# Patient Record
Sex: FEMALE | Race: WHITE | Employment: FULL TIME | URBAN - METROPOLITAN AREA
[De-identification: names, ages, dates, MRNs, and addresses within clinical notes are randomized per-mention and may not be internally consistent; named-entity substitution may affect disease eponyms.]

---

## 2019-04-01 ENCOUNTER — APPOINTMENT (OUTPATIENT)
Dept: PHYSICAL THERAPY | Age: 29
End: 2019-04-01
Attending: FAMILY MEDICINE

## 2019-04-05 ENCOUNTER — APPOINTMENT (OUTPATIENT)
Dept: PHYSICAL THERAPY | Age: 29
End: 2019-04-05
Attending: FAMILY MEDICINE

## 2019-04-26 ENCOUNTER — HOSPITAL ENCOUNTER (OUTPATIENT)
Dept: PHYSICAL THERAPY | Age: 29
Discharge: HOME | End: 2019-04-26
Attending: FAMILY MEDICINE
Payer: OTHER GOVERNMENT

## 2019-04-26 DIAGNOSIS — M62.838 MUSCLE SPASM: ICD-10-CM

## 2019-04-26 PROCEDURE — 97110 THERAPEUTIC EXERCISES: CPT

## 2019-04-26 PROCEDURE — 97161 PT EVAL LOW COMPLEX 20 MIN: CPT

## 2019-04-26 NOTE — THERAPY EVALUATION
Sasha Palma  : 1990  Primary: [place]  Secondary:  [address]  at   [address], [address], [place], [address] [facility]  [phone]   [phone]         OUTPATIENT PHYSICAL THERAPY:Initial Assessment and Discharge 2019    ICD-10: Treatment Diagnosis: Pain in thoracic spine (M54.6)  Muscle weakness (generalized) (M62.81)  Precautions/Allergies:   Gluten   MD Orders: evaluate and treat MEDICAL/REFERRING DIAGNOSIS:  Muscle spasm [M62.838]   DATE OF ONSET: 3/18/19  REFERRING PHYSICIAN: Ruth Perez*  RETURN PHYSICIAN APPOINTMENT: none scheduled      ASSESSMENT (Date: 19):  Ms. Emery Tate presents to physical therapy with complaints of thoracic pain several weeks ago that has now resolved. She is concerned about the pain returning as she doesn't know what caused it the first time. Upon examination, she demonstrates some weakness in the rotator cuff muscles as well as increased tone through the thoracic and cervical paraspinals. This is likely compensatory from the weakness. She was educated on rotator cuff exercises to improve strength and decrease compensatory movements. She was also educated on thoracic mobilization exercises in order to address any future thoracic issues. She demonstrated understanding. No further skilled PT is required at this time. PROBLEM LIST (Impacting functional limitations):  1. Decreased Strength  2. Decreased [place] with Home Exercise Program INTERVENTIONS PLANNED:  1. none   TREATMENT PLAN:  1. Discharge at this time 2° no further skilled treatment indicated    Outcome Measure: Tool Used: Modified Oswestry Low Back Pain Questionnaire  Score:  Initial:  - patient filled it out based on previous pain, not current pain Most Recent:  (Date: -- )   Interpretation of Score: Each section is scored on a 0-5 scale, 5 representing the greatest disability.   The scores of each section are added together for a total score of 50. Ambulatory/Rehab Services H2 Model Falls Risk Assessment    Risk Factors:       No Risk Factors Identified Ability to Rise from Chair:       (0)  Ability to rise in a single movement    Falls Prevention Plan:       No modifications necessary   Total: (5 or greater = High Risk): 0    ©2010 [facility] [place]. All Rights Reserved. [place] Patent #[id number]. Federal Law prohibits the replication, distribution or use without written permission from [facility] [employer]         Total Duration:  PT Patient Time In/Time Out  Time In: 0800  Time Out: 0840        Rehabilitation Potential For Stated Goals: Excellent  Regarding Kandice TONY Weinstein's therapy, I certify that the treatment plan above will be carried out by a therapist or under their direction. Thank you for this referral,  Paulina Britton DPT                HISTORY:   Patient Stated Goal:        Prevent future problems  History of Present Injury/Illness (Reason for Referral):  Localized back pain in the thoracic spine, R side. The pain is gone now but was present for about 2 weeks. Position changes didn't help. Muscle relaxer's helped some. She did a steroid injection that helped for a bit. But the pain was back a few days late. A patch with an anti-inflammatory made the pain go away. She was compensating and now has issues with her neck and soreness there. Thoracic pain has been gone April 1. Past Medical History/Comorbidities:   Ms. Yudelka Holguin  has no past medical history on file. Ms. Yudelka Holguin  has a past surgical history that includes hx wisdom teeth extraction. Per medical history questionnaire/therapist interview: none  Social History/Living Environment:     lives with . Independent with ADLs and IADLs  Prior Level of Function/Work/Activity:  Works as a speech pathologist.  Driving. Carrying heavy bags.    Dominant Side:         RIGHT  Current Medications:    Current Outpatient Medications:     diclofenac (FLECTOR) 1.3 % pt12, 1 Patch by TransDERmal route every twelve (12) hours every twelve (12) hours. , Disp: 4 Patch, Rfl: 0    cyclobenzaprine (FLEXERIL) 10 mg tablet, Take 1 Tab by mouth three (3) times daily as needed for Muscle Spasm(s). , Disp: 20 Tab, Rfl: 0    ibuprofen (MOTRIN) 800 mg tablet, Take 1 Tab by mouth every eight (8) hours as needed for Pain., Disp: , Rfl:     norgestimate-ethinyl estradiol (ORTHO-CYCLEN, SPRINTEC) 0.25-35 mg-mcg tab, Take 1 Tab by mouth daily. , Disp: 1 Package, Rfl: 11    crisaborole (EUCRISA) 2 % oint, Apply thin layer to affected areas twice daily. , Disp: 15 g, Rfl: 0     Date Last Reviewed:  4/26/2019       Number of Personal Factors/Comorbidities that affect the Plan of Care: 0: LOW COMPLEXITY   EXAMINATION:   Observation/Orthostatic Postural Assessment:         Slightly rounded shoulders. Increased lumbar mobility with a slight decrease in thoracic mobility. Mental Status:          WFL  Palpation:          Increased upper thoracic and lower cervical muscle tone. Increased trigger points over thoracic spinal musculature, RTC muscles, upper traps  Sensation:         Light tough: WFL; Proprioception: WFL  Skin Integrity:          Grossly intact  Vision:          NT  Special Tests:          No positive testing  Coordination:       WFL  Balance:          WFL    Lower Extremity:   Strength PROM   Action R L R  L    Hip Flexion       Hip Extension       Hip Abduction       Hip Adduction       Knee Flexion       Knee Extension       Dorsi Flexion       Plantar Flexion       Inversion       Eversion                 Upper Extremity:         Shoulder horizontal abduction 4/5 B  Scapular upward rotation 4/5 B  IR/ER grossly 4+/5 B  Functional Mobility:         Gait/Ambulation:  WFL        Transfers:  WFL        Bed Mobility:  WFL        Stairs:  NT        Wheelchair:  NT              Body Structures Involved:  1. Bones  2. Joints  3.  Muscles Body Functions Affected:  1. None Activities and Participation Affected:  1.  None   Number of elements that affect the Plan of Care: 3: MODERATE COMPLEXITY   CLINICAL PRESENTATION:   Presentation: Stable and uncomplicated: LOW COMPLEXITY   CLINICAL DECISION MAKING:      Use of outcome tool(s) and clinical judgement create a POC that gives a: Clear prediction of patient's progress: LOW COMPLEXITY            EMILY WalshT

## 2019-04-26 NOTE — PROGRESS NOTES
[place]  : 1990  Primary: [place]  Secondary:  [address]  at [facility]  [address], [address], [place], [facility] [address] [facility]  [phone]   [phone]      OUTPATIENT PHYSICAL THERAPY: Daily Treatment Note 2019  Pre-treatment Symptoms/Complaints:  No current pain  Pain: Initial:   0/10 Post Session:  0/10   Medications Last Reviewed:  2019  Updated Objective Findings:  See evaluation note from today   TREATMENT:     THERAPEUTIC EXERCISE: (15 minutes):  Exercises per grid below to improve mobility, strength and dynamic movement of back - generalized to improve functional bending, lifting, carrying and overhead activites. Required moderate visual, verbal and tactile cues to promote proper body alignment and promote proper body mechanics. Progressed complexity of movement as indicated. Date:  19 Date:   Date:     Activity/Exercise Parameters Parameters Parameters   Thoracic mobilization in quadruped X 5 reps B     Foam roll X 10 reps     PWR sidelying twist X 5 reps B     Prone T X 5 reps B     Prone Y X 5 reps B     Sidelying shoulder flexion with arm at 90 ° elbow flexion X 5 reps L               MedBridge Portal  Treatment/Session Summary:    · Response to Treatment:  no increased pain. demonstrated independence with HEP. · Communication/Consultation:  None today  · Equipment provided today:  None today  · Recommendations/Intent for next treatment session: No more treatment indicated. Treatment Plan of Care Effective Dates:  19 to 19  Total Treatment Billable Duration:  15 minutes  PT Patient Time In/Time Out  Time In: 0800  Time Out: 1077 Amalia Simpson DPT    No future appointments.

## 2020-11-20 ENCOUNTER — HOSPITAL ENCOUNTER (OUTPATIENT)
Dept: PHYSICAL THERAPY | Age: 30
Discharge: HOME | End: 2020-11-20
Payer: OTHER GOVERNMENT

## 2020-11-20 DIAGNOSIS — M25.531 BILATERAL WRIST PAIN: ICD-10-CM

## 2020-11-20 DIAGNOSIS — M25.532 BILATERAL WRIST PAIN: ICD-10-CM

## 2020-11-20 PROCEDURE — 97165 OT EVAL LOW COMPLEX 30 MIN: CPT

## 2020-11-20 PROCEDURE — 97018 PARAFFIN BATH THERAPY: CPT

## 2020-11-20 PROCEDURE — 97110 THERAPEUTIC EXERCISES: CPT

## 2020-11-20 PROCEDURE — 97140 MANUAL THERAPY 1/> REGIONS: CPT

## 2020-11-20 NOTE — THERAPY EVALUATION
Pasquale Mantilla  : 1990  Primary: [place]  Secondary:  [address]  at [facility]  [address], [address] [facility], [address]  [phone]   [phone]       OUTPATIENT OCCUPATIONAL THERAPY:Initial Assessment 2020   ICD-10: Treatment Diagnosis: Pain in left wrist (M25.532); Pain in right wrist (M25.531)  Precautions/Allergies:   Gluten   TREATMENT PLAN:  Effective Dates: 2020 TO 2021 (90 days). Frequency/Duration: 1 time a week for 90 Day(s) MEDICAL/REFERRING DIAGNOSIS:  Bilateral wrist pain [M25.531, M25.532]   DATE OF ONSET: ~1 year ago  REFERRING PHYSICIAN: Michael Martinez*  MD Orders: OT evaluate and treat. Return MD Appointment: Pending. INITIAL ASSESSMENT:   Ms. Liliya Pereira presents with decreased functional use, strength and range of motion of her bilateral wrist and upper extremity that is affecting her independence with activities of daily living and ability to perform job tasks. I feel that Ms. Liliya Pereira will benefit from skilled occupational therapy to maximize the functional use of her wrist and upper extremity in daily activities and work tasks. PROBLEM LIST (Impacting functional limitations):  1. Pain in bilateral wrists. 2. Decreased motion in L wrist.  3. Decreased strength in B UE. INTERVENTIONS PLANNED: (Treatment may consist of any combination of the following)  1. Modalities that may include fluidotherapy, paraffin, ultrasound, and light therapy. 2. Therapeutic exercise including a home exercise program.  3. Manual therapy. 4. Therapeutic activities. 5. Orthotic management and training as warranted. GOALS: (Goals have been discussed and agreed upon with patient.)  Short-Term Functional Goals: Time Frame: 4 weeks  1. Decrease pain to moderate or less per Quick-DASH to allow patient to perform self care tasks.   2. Increase motion in L wrist extension flexion by 10 degrees to allow patient to perform all ADL activities. .  3. Complete home B UE exercise program independently. Discharge Goals: Time Frame: 12 weeks  1. Decrease pain to minimal or none per Quick-DASH to allow patient to perform all household and work tasks. 2. Increase motion in L wrist extension flexion by 20 degrees to allow patient to perform all ADL activities. 3. Increase strength in bilateral  by 5-10 pounds to allow patient to , lift, hold, and carry heavy objects. OUTCOME MEASURE:   Tool Used: Disabilities of the Arm, Shoulder and Hand (DASH) Questionnaire - Quick Version  Score:  Initial: 24/55  Most Recent: X/55 (Date: -- )   Interpretation of Score: The DASH is designed to measure the activities of daily living in person's with upper extremity dysfunction or pain. Each section is scored on a 1-5 scale, 5 representing the greatest disability. The scores of each section are added together for a total score of 55. MEDICAL NECESSITY:   · Patient demonstrates good rehab potential due to higher previous functional level. REASON FOR SERVICES/OTHER COMMENTS:  · Patient continues to require skilled intervention due to bilateral wrist pain affecting her overall independence with instrumental ADL and work tasks. Total Duration:  OT Patient Time In/Time Out  Time In: 1430  Time Out: 1515    Rehabilitation Potential For Stated Goals: Good  Regarding Kandice Weinstein's therapy, I certify that the treatment plan above will be carried out by a therapist or under their direction. Thank you for this referral,  Marcin Stout, OTR/L     Referring Physician Signature: Chapito Sanchez*                 PAIN/SUBJECTIVE:   Initial: Pain Intensity 1: 0(at rest)  Post Session:  0/10   OCCUPATIONAL PROFILE & HISTORY:   History of Injury/Illness (Reason for Referral):  Juan Conde states her dorsal wrists have been hurting her for a year.   She states the pain waxes and wanes, but has increased in intensity over the last month or two.  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  Patient states she has pain when she picks her bag up. Past Medical History/Comorbidities:   Ms. Prashant Coker  has no past medical history on file. Ms. Prashant Coker  has a past surgical history that includes hx wisdom teeth extraction. Social History/Living Environment:    Lives with . 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 .    Dominant Side:         RIGHT  Previous Treatment Approaches:          Outpatient PT. Ambulatory/Rehab Services H2 Model Falls Risk Assessment   Risk Factors:       No Risk Factors Identified Ability to Rise from Chair:       (1)  Pushes up, successful in one attempt   Falls Prevention Plan:       No modifications necessary   Total: (5 or greater = High Risk): 0   ©2010 [facility] [place]. All Rights Reserved. [facility] Patent #[id number]. Federal Law prohibits the replication, distribution or use without written permission from [employer]   Current Medications:     No current outpatient medications on file. Date Last Reviewed:  11/20/2020    Complexity Level: Brief history (0):  LOW COMPLEXITY   ASSESSMENT OF OCCUPATIONAL PERFORMANCE:   RANGE OF MOTION:     · AROM:          RUE:  Wrist extension: 75  Flexion: 75  RD: 20  UD: 30  LUE:  Wrist extension: 70  Flexion: 70  RD: 15  UD: 30    · PROM:         L UE:  Wrist extension: 80  Flexion: 80  STRENGTH:   Wrist extension, flexion, finger extension grossly 4+/5.  Strength (Dynamometer on second rung; Average in pounds) 11/20     LUE 62     RUE 67       SENSATION:  Denies numbness/tingling. OBSERVATION/PALPATION:  Tenderness/tightness over wrist/finger extensor muscle bulk. Tenderness over dorsal capitate. EDEMA: None noted.   SPECIAL TESTS: Finklestein's: -; Scaphoid shift: -; Lunate/triquetrum: (tenderness over triquetrum bilterally) Physical Skills Involved:  1. Range of Motion  2. Strength  3. Pain (Chronic) Cognitive Skills Affected (resulting in the inability to perform in a timely and safe manner):  1. None noted or specifically tested Psychosocial Skills Affected:  1. Habits/Routines  2. Social Interaction  3. Social Roles   Number of elements that affect the Plan of Care[de-identified] 1-3:  LOW COMPLEXITY   CLINICAL DECISION MAKING:   Clinical Decision-Making Assessment:  Garo Brennan required none to minimal verbal, visual, physical or environmental cues to carry out assessment tasks.    Assessment process, impact of co-morbidities, assessment modification\need for assistance, and selection of interventions: Analytical Complexity:LOW COMPLEXITY

## 2020-11-20 NOTE — PROGRESS NOTES
Garo Brennan  : 1990  Primary: [place]  Secondary:  [address] at [facility], [address], [place], [facility] [address] [facility]  [phone]   [phone]      OUTPATIENT OCCUPATIONAL THERAPY: Daily Treatment Note 2020  Visit Count:  1    ICD-10: Treatment Diagnosis: Pain in left wrist (M25.532); Pain in right wrist (M25.531)  Precautions/Allergies:   Gluten   TREATMENT PLAN:  Effective Dates: 2020 TO 2021 (90 days). Frequency/Duration: 1 time a week for 90 Day(s)    Pre-treatment Symptoms/Complaints:    Pain: Initial: Pain Intensity 1: 0(at rest)  Post Session:  same/10   Medications Last Reviewed:  2020   Updated Objective Findings:  See evaluation note from today   TREATMENT:   MODALITIES: (5-10 minutes): *  Hot Pack Therapy in order to provide analgesia and improve tissue extensibility in preparation for theapeutic exercises. MODALITIES: (5-10 minutes): *  Paraffin Therapy in order to provide analgesia and improve tissue extensibility in preparation for theapeutic exercises. Therapeutic Exercise: (  5 minutes):  Exercises per grid below to improve mobility and strength. Required minimal visual, verbal, manual and tactile cues to promote proper body alignment, promote proper body posture, promote proper body mechanics and promote proper body breathing techniques. Progressed resistance, range, repetitions and complexity of movement as indicated. Date:   Date:   Date:     Activity/Exercise Parameters Parameters Parameters   Passive extrinsic wrist/finger extensor stretch 2-3 sets held 30-60 seconds each. Home program: As above. Manual Therapy: (10 minutes): Soft tissue mobilization was utilized and necessary because of the patient's restricted joint motion and restricted motion of soft tissue.   Petrissage/effleurage completed over wrist/finger extensor muscle bulk utilizing massage cream in preparation for therapeutic exercise. Patient states it feels \"tight. \"   MedBridge Portal  Treatment/Session Summary:    · Response to Treatment:  tolerated well without complications. · Communication/Consultation:  Eval sent to MD  · Equipment provided today:  None today  · Recommendations/Intent for next treatment session: Next visit will focus on advancement to more challenging activities.     Total Treatment Billable Duration:  45 minutes  OT Patient Time In/Time Out  Time In: 1430  Time Out: 1515  Kate Goodwin OTR/L    Future Appointments   Date Time Provider Isai Bob   12/9/2020  8:45 AM Jaja Fitting, OTR/L [facility]   12/16/2020 11:00 AM Jaja Fitting, OTR/L [facility]   12/21/2020  3:15 PM Jaja Fitting, OTR/L [facility]   1/5/2021  3:15 PM Chrissie Gonzales MD [facility]

## 2020-12-09 ENCOUNTER — HOSPITAL ENCOUNTER (OUTPATIENT)
Dept: PHYSICAL THERAPY | Age: 30
Discharge: HOME | End: 2020-12-09
Payer: OTHER GOVERNMENT

## 2020-12-09 PROCEDURE — 97110 THERAPEUTIC EXERCISES: CPT

## 2020-12-09 PROCEDURE — 97140 MANUAL THERAPY 1/> REGIONS: CPT

## 2020-12-09 NOTE — PROGRESS NOTES
Julia Cole  : 1990  Primary: [place]  Secondary:  [address] at [address] [facility] [address], [address], [place], [facility]  [phone]   [phone]      OUTPATIENT OCCUPATIONAL THERAPY: Daily Treatment Note 2020  Visit Count:  2    ICD-10: Treatment Diagnosis: Pain in left wrist (M25.532); Pain in right wrist (M25.531)  Precautions/Allergies:   Gluten   TREATMENT PLAN:  Effective Dates: 2020 TO 2021 (90 days). Frequency/Duration: 1 time a week for 90 Day(s)    Pre-treatment Symptoms/Complaints:  Patient states she went to yoga yesterday and her medial L elbow hurt following her her forearms were sore. Pain: Initial: Pain Intensity 1: 0  Post Session:  same/10   Medications Last Reviewed:  2020   Updated Objective Findings:  See evaluation note from today   TREATMENT:   MODALITIES: (5-10 minutes): *  Hot Pack Therapy in order to provide analgesia and improve tissue extensibility in preparation for therapeutic exercises. Hot pack placed over bilateral forearms - stated it felt good. MODALITIES: (0 minutes): *  Paraffin Therapy in order to provide analgesia and improve tissue extensibility in preparation for theapeutic exercises. Therapeutic Exercise: (  23 minutes):  Exercises per grid below to improve mobility and strength. Required minimal visual, verbal, manual and tactile cues to promote proper body alignment, promote proper body posture, promote proper body mechanics and promote proper body breathing techniques. Progressed resistance, range, repetitions and complexity of movement as indicated. Date:   Date:   Date:     Activity/Exercise Parameters Parameters Parameters   Passive extrinsic wrist/finger extensor stretch 2-3 sets held 30-60 seconds each. 2-3 sets held 30-60 seconds L UE (review).     Eccentric wrist extension   2-3 sets 5-10 reps with cues to complete slowly 4 lb dumbbell (R UE discontinued after first set - encouraged to use 3 lb instead)    Finger extensor strengthening  1. Green theraputty 2-3 sets 4-5 reps. 2. Rubber bands 1-2 sets 5-10 reps. Home program: As above. Manual Therapy: (15 minutes): Soft tissue mobilization was utilized and necessary because of the patient's restricted joint motion and restricted motion of soft tissue. Petrissage/effleurage completed over wrist/finger extensor muscle bulk utilizing massage cream in preparation for therapeutic exercise. Patient states it feels \"sore\" over her dorsal forearm. 10\" strip of kinesiotape applied over proximal carpal row/ulnar head dorsally moving radially around R wrist twice with 25-50% tension to stabilize wrist and encourage motion. Patient states it felt fine. A ~16\" strip of kinesiotape was cut with 3-4\" distal bifurcation with rounded edges and applied to lateral epicondyle proximally and along wrist/finger extensor muscle bulk with 50% tension then across wrist to dorsum of index/middle fingers just distal to PIP joint. 4 small 2\" x 1/2\" strips of kinesiotape were cut and placed circumferentially around the middle phalanx of index/middle fingers to hold longitudinal strip of kinesiotape. Patient states it felt like it assisted wrist extension on the R. She was encouraged to check for any signs of erythema. [facility] Portal  Treatment/Session Summary:  Patient would benefit from scapulothoracic home exercises to unload/discourage overuse of the wrist/finger extensors. Hopefully eccentric strengthening and kinesiotaping will help to strengthen and rest the wrist/finger extensors. Continue OT per plan of care. · Response to Treatment:  tolerated well without complications.   · Communication/Consultation:  None today  · Equipment provided today:  None today  · Recommendations/Intent for next treatment session: Next visit will focus on advancement to more challenging activities.     Total Treatment Billable Duration:  45 minutes  OT Patient Time In/Time Out  Time In: 0845  Time Out: 0930  Kate Goodwin OTR/L    Future Appointments   Date Time Provider Isai Bob   12/16/2020 11:00 AM Jaja Fitting, OTR/L [facility]   12/21/2020  3:15 PM Jaja Fitting, OTR/L [facility]   1/5/2021  3:15 PM Chrissie Gonzales MD [facility]

## 2020-12-16 ENCOUNTER — HOSPITAL ENCOUNTER (OUTPATIENT)
Dept: PHYSICAL THERAPY | Age: 30
Discharge: HOME | End: 2020-12-16
Payer: OTHER GOVERNMENT

## 2020-12-16 PROCEDURE — 97110 THERAPEUTIC EXERCISES: CPT

## 2020-12-16 NOTE — PROGRESS NOTES
Garo Brennan  : 1990  Primary: [place]  Secondary:  [address] at [facility]  [address], [address], [place] [address] [facility]  [phone]   [phone]      OUTPATIENT OCCUPATIONAL THERAPY: Daily Treatment Note 2020  Visit Count:  3    ICD-10: Treatment Diagnosis: Pain in left wrist (M25.532); Pain in right wrist (M25.531)  Precautions/Allergies:   Gluten   TREATMENT PLAN:  Effective Dates: 2020 TO 2021 (90 days). Frequency/Duration: 1 time a week for 90 Day(s)    Pre-treatment Symptoms/Complaints:  Patient states she has been going to yoga. Not as much soreness/pain as before. Pain: Initial: Pain Intensity 1: 0  Post Session:  same/10   Medications Last Reviewed:  2020   Updated Objective Findings:  None Today   TREATMENT:   MODALITIES: (0 minutes): *  Hot Pack Therapy in order to provide analgesia and improve tissue extensibility in preparation for therapeutic exercises. Hot pack placed over bilateral forearms - stated it felt good. MODALITIES: (0 minutes): *  Paraffin Therapy in order to provide analgesia and improve tissue extensibility in preparation for theapeutic exercises. Therapeutic Exercise: (  38 minutes):  Exercises per grid below to improve mobility and strength. Required minimal visual, verbal, manual and tactile cues to promote proper body alignment, promote proper body posture, promote proper body mechanics and promote proper body breathing techniques. Progressed resistance, range, repetitions and complexity of movement as indicated. Date:   Date:   Date:     Activity/Exercise Parameters Parameters Parameters   UBE    8 minutes with 3.0 resistance   Passive extrinsic wrist/finger extensor stretch 2-3 sets held 30-60 seconds each. 2-3 sets held 30-60 seconds L UE (review).     Eccentric wrist extension   2-3 sets 5-10 reps with cues to complete slowly 4 lb dumbbell (R UE discontinued after first set - encouraged to use 3 lb instead)    Finger extensor strengthening  1. Green theraputty 2-3 sets 4-5 reps. 2. Rubber bands 1-2 sets 5-10 reps. Shoulder retraction   1. Elbows flexed A: pink tubing 2-3 sets 15-20 reps at various angles. B: yellow theraband 2-3 sets 15-20 reps at various angles   \"I\"    1. Prone 1-2 sets 5-10 reps. 2. Leaning against swiss ball 1-2 sets 10-15 reps. \"T\"   1. Prone A: 1-2 sets 10-15 reps. B: 1-2 sets 10-15 reps 1/2 lb wrist cuff. 2. Leaning against swiss ball 1-2 sets 10-15 reps. \"Y\"   1. Prone 1-2 sets 5-10 reps. 2. Leaning against swiss ball 1-2 sets 10-15 reps. Sahrman's lower trap activation   1-2 sets 5-10 reps. Home program: As above. [employer]  Treatment/Session Summary:  Patient fatigued quickly with scapulothoracic exercises (in particularly lower trap). She was given several strengthening exercises to unload/discourage overuse of the wrist/finger extensors. She has employed kinesiotaping and reports somewhat less soreness/wrist pain. Continue OT per plan of care. · Response to Treatment:  tolerated well without complications. · Communication/Consultation:  None today  · Equipment provided today:  None today  · Recommendations/Intent for next treatment session: Next visit will focus on advancement to more challenging activities.     Total Treatment Billable Duration:  45 minutes  OT Patient Time In/Time Out  Time In: 1100  Time Out: 1145  Marcin Stout OTR/L    Future Appointments   Date Time Provider Isai Bob   12/21/2020  3:15 PM Latia Null OTR/L [facility]   1/5/2021  3:15 PM Lizette Olvera MD [facility] [address] [place] [address]

## 2020-12-21 ENCOUNTER — HOSPITAL ENCOUNTER (OUTPATIENT)
Dept: PHYSICAL THERAPY | Age: 30
Discharge: HOME | End: 2020-12-21
Payer: OTHER GOVERNMENT

## 2021-01-22 NOTE — THERAPY DISCHARGE
Jemma Burger : 1990 Primary: [address] [facility] Secondary:  Therapy Center at [address] [place] [address] 68, [address] [facility], [place] [address] [address] [facility] [phone]   [phone] OUTPATIENT OCCUPATIONAL THERAPY:Discontinuation Summary 2020 ICD-10: Treatment Diagnosis: Pain in left wrist (M25.532); Pain in right wrist (M25.531) Precautions/Allergies:  
Gluten TREATMENT PLAN: 
Effective Dates: 2020 TO 2021 (90 days). Frequency/Duration: 1 time a week for 90 Day(s) MEDICAL/REFERRING DIAGNOSIS: 
Pain in right wrist [M25.531] Pain in left wrist [M25.532] DATE OF ONSET: ~1 year ago REFERRING PHYSICIAN: Gilberto Coyle* 
MD Orders: OT evaluate and treat. Return MD Appointment: Pending. DISCONTINUATION SUMMARY:  Jemma Burger has been seen in occupational therapy from 20 to 20 for 3 visits. Treatment has been discontinued at this time due to patient not returning for additional treatment. The below goals were met prior to discontinuation. Some goals were not met due to inability to re-assess. She was given a home exercise program and was independent with it as of 20. Thank you for this referral.    
  
 
GOALS: (Goals have been discussed and agreed upon with patient.) Not formally re-assessed. Discontinue. Short-Term Functional Goals: Time Frame: 4 weeks 1. Decrease pain to moderate or less per Quick-DASH to allow patient to perform self care tasks. 2. Increase motion in L wrist extension flexion by 10 degrees to allow patient to perform all ADL activities. Citlali Gonsales 3. Complete home B UE exercise program independently. Discharge Goals: Time Frame: 12 weeks 1. Decrease pain to minimal or none per Quick-DASH to allow patient to perform all household and work tasks. 2. Increase motion in L wrist extension flexion by 20 degrees to allow patient to perform all ADL activities. 3. Increase strength in bilateral  by 5-10 pounds to allow patient to , lift, hold, and carry heavy objects. OUTCOME MEASURE:  
Tool Used: Disabilities of the Arm, Shoulder and Hand (DASH) Questionnaire - Quick Version Score:  Initial: 24/55  Most Recent: X/55 (Date: -- ) Interpretation of Score: The DASH is designed to measure the activities of daily living in person's with upper extremity dysfunction or pain. Each section is scored on a 1-5 scale, 5 representing the greatest disability. The scores of each section are added together for a total score of 55.    
Thank you for this referral, 
GLENDA Butler, OTR/L

## 2021-02-11 ENCOUNTER — APPOINTMENT (RX ONLY)
Dept: URBAN - METROPOLITAN AREA CLINIC 349 | Facility: CLINIC | Age: 31
Setting detail: DERMATOLOGY
End: 2021-02-11

## 2021-02-11 DIAGNOSIS — L30.9 DERMATITIS, UNSPECIFIED: ICD-10-CM

## 2021-02-11 PROCEDURE — ? KOH PREP

## 2021-02-11 PROCEDURE — ? COUNSELING

## 2021-02-11 PROCEDURE — 87220 TISSUE EXAM FOR FUNGI: CPT

## 2021-02-11 PROCEDURE — ? OTHER

## 2021-02-11 PROCEDURE — 99202 OFFICE O/P NEW SF 15 MIN: CPT

## 2021-02-11 ASSESSMENT — LOCATION SIMPLE DESCRIPTION DERM
LOCATION SIMPLE: RIGHT UPPER ARM
LOCATION SIMPLE: ABDOMEN

## 2021-02-11 ASSESSMENT — LOCATION DETAILED DESCRIPTION DERM
LOCATION DETAILED: RIGHT ANTECUBITAL SKIN
LOCATION DETAILED: LEFT RIB CAGE
LOCATION DETAILED: RIGHT LATERAL ABDOMEN

## 2021-02-11 ASSESSMENT — LOCATION ZONE DERM
LOCATION ZONE: TRUNK
LOCATION ZONE: ARM

## 2021-02-11 NOTE — PROCEDURE: OTHER
Note Text (......Xxx Chief Complaint.): This diagnosis correlates with the
Render Risk Assessment In Note?: yes
Other (Free Text): Patient is here for a rash on her abdomen and back. She states this started about two months ago when she was exposed to ringworm but a friends kid. She states the rash doesn’t itch but it is red circles all over her body. She did a Virtual visit with her primary care physician and they started her on lamisil and when that didn’t help they put her on diflucan. Nothing has helped. She has been using an otc cream. \\n\\nPatient denies fever or illness. \\n\\nClinically does not appear like ringworm. We will KOH. \\n\\nDenies any new medications.\\n\\nKOH negative. \\n\\nExplained it will run it’s course and go away on its own. \\n\\nRecommended a few minutes of sunlight each day. \\n\\nRecheck in one month. Advised to call if condition worsens.
Detail Level: Zone

## 2021-02-11 NOTE — HPI: RASH
Is The Patient Presenting As Previously Scheduled?: Yes
How Severe Is Your Rash?: mild
Is This A New Presentation, Or A Follow-Up?: Rash
Additional History: Patient is here for a rash on her abdomen and back. She states this started about two months ago when she was exposed to ringworm but a friends kid. She states the rash doesn’t itch but it is red circles all over her body. She did a Virtual visit with her primary care physician and they started her on lamisil and when that didn’t help they put her on diflucan. Nothing has helped. She has been using an otc cream.

## 2021-03-03 ENCOUNTER — APPOINTMENT (OUTPATIENT)
Dept: GENERAL RADIOLOGY | Age: 31
End: 2021-03-03
Attending: EMERGENCY MEDICINE
Payer: OTHER GOVERNMENT

## 2021-03-03 ENCOUNTER — HOSPITAL ENCOUNTER (EMERGENCY)
Age: 31
Discharge: HOME | End: 2021-03-03
Attending: EMERGENCY MEDICINE
Payer: OTHER GOVERNMENT

## 2021-03-03 VITALS
BODY MASS INDEX: 20.46 KG/M2 | TEMPERATURE: 98.2 F | DIASTOLIC BLOOD PRESSURE: 82 MMHG | HEART RATE: 82 BPM | WEIGHT: 135 LBS | SYSTOLIC BLOOD PRESSURE: 145 MMHG | HEIGHT: 68 IN | RESPIRATION RATE: 16 BRPM | OXYGEN SATURATION: 100 %

## 2021-03-03 DIAGNOSIS — S61.551A DOG BITE OF RIGHT WRIST, INITIAL ENCOUNTER: Primary | ICD-10-CM
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
## 2021-03-03 LAB
BACTERIA URNS QL MICRO: 0 /HPF
CASTS URNS QL MICRO: 0 /LPF
EPI CELLS #/AREA URNS HPF: 0 /HPF
HCG UR QL: NEGATIVE
RBC #/AREA URNS HPF: 0 /HPF
WBC URNS QL MICRO: 0 /HPF

## 2021-03-03 PROCEDURE — 90715 TDAP VACCINE 7 YRS/> IM: CPT | Performed by: NURSE PRACTITIONER

## 2021-03-03 PROCEDURE — 99284 EMERGENCY DEPT VISIT MOD MDM: CPT

## 2021-03-03 PROCEDURE — 81003 URINALYSIS AUTO W/O SCOPE: CPT

## 2021-03-03 PROCEDURE — 73110 X-RAY EXAM OF WRIST: CPT

## 2021-03-03 PROCEDURE — 74011250637 HC RX REV CODE- 250/637: Performed by: NURSE PRACTITIONER

## 2021-03-03 PROCEDURE — 90675 RABIES VACCINE IM: CPT | Performed by: NURSE PRACTITIONER

## 2021-03-03 PROCEDURE — 90472 IMMUNIZATION ADMIN EACH ADD: CPT

## 2021-03-03 PROCEDURE — 90471 IMMUNIZATION ADMIN: CPT

## 2021-03-03 PROCEDURE — 90375 RABIES IG IM/SC: CPT | Performed by: NURSE PRACTITIONER

## 2021-03-03 PROCEDURE — 81015 MICROSCOPIC EXAM OF URINE: CPT

## 2021-03-03 PROCEDURE — 74011250636 HC RX REV CODE- 250/636: Performed by: NURSE PRACTITIONER

## 2021-03-03 PROCEDURE — 73130 X-RAY EXAM OF HAND: CPT

## 2021-03-03 PROCEDURE — 81025 URINE PREGNANCY TEST: CPT

## 2021-03-03 RX ORDER — ACETAMINOPHEN AND CODEINE PHOSPHATE 300; 30 MG/1; MG/1
1 TABLET ORAL
Qty: 18 TAB | Refills: 0 | Status: SHIPPED | OUTPATIENT
Start: 2021-03-03 | End: 2021-03-06

## 2021-03-03 RX ORDER — HYDROCODONE BITARTRATE AND ACETAMINOPHEN 5; 325 MG/1; MG/1
1 TABLET ORAL ONCE
Status: COMPLETED | OUTPATIENT
Start: 2021-03-03 | End: 2021-03-03

## 2021-03-03 RX ORDER — AMOXICILLIN AND CLAVULANATE POTASSIUM 875; 125 MG/1; MG/1
1 TABLET, FILM COATED ORAL 2 TIMES DAILY
Qty: 14 TAB | Refills: 0 | Status: SHIPPED | OUTPATIENT
Start: 2021-03-03 | End: 2021-03-10

## 2021-03-03 RX ADMIN — RABIES IMMUNE GLOBULIN (HUMAN) 1230 UNITS: 300 INJECTION, SOLUTION INFILTRATION; INTRAMUSCULAR at 19:30

## 2021-03-03 RX ADMIN — HYDROCODONE BITARTRATE AND ACETAMINOPHEN 1 TABLET: 5; 325 TABLET ORAL at 18:25

## 2021-03-03 RX ADMIN — TETANUS TOXOID, REDUCED DIPHTHERIA TOXOID AND ACELLULAR PERTUSSIS VACCINE, ADSORBED 0.5 ML: 5; 2.5; 8; 8; 2.5 SUSPENSION INTRAMUSCULAR at 18:16

## 2021-03-03 RX ADMIN — RABIES VACCINE 2.5 UNITS: KIT at 18:51

## 2021-03-03 NOTE — ED PROVIDER NOTES
32-year-old female presents with significant other for evaluation after dog bite. She was running and someone else's dog came up to her and bit her. It did not hurt initially but shortly after she noticed that she was bleeding. She does not know the person or the dog. Unknown if the dog is current on rabies vaccine. She states that however, the dog did not appear ill. Unknown last tetanus. Bleeding controlled. No past medical history on file. Past Surgical History:   Procedure Laterality Date    HX WISDOM TEETH EXTRACTION           Family History:   Problem Relation Age of Onset    Hypertension Mother        Social History     Socioeconomic History    Marital status:      Spouse name: Not on file    Number of children: Not on file    Years of education: Not on file    Highest education level: Not on file   Occupational History    Not on file   Social Needs    Financial resource strain: Not on file    Food insecurity     Worry: Not on file     Inability: Not on file    Transportation needs     Medical: Not on file     Non-medical: Not on file   Tobacco Use    Smoking status: Never Smoker    Smokeless tobacco: Never Used   Substance and Sexual Activity    Alcohol use:  Yes     Alcohol/week: 3.0 standard drinks     Types: 3 Standard drinks or equivalent per week    Drug use: No    Sexual activity: Yes     Partners: Male     Birth control/protection: None   Lifestyle    Physical activity     Days per week: Not on file     Minutes per session: Not on file    Stress: Not on file   Relationships    Social connections     Talks on phone: Not on file     Gets together: Not on file     Attends [employer] service: Not on file     Active member of club or organization: Not on file     Attends meetings of clubs or organizations: Not on file     Relationship status: Not on file    Intimate partner violence     Fear of current or ex partner: Not on file     Emotionally abused: Not on file     Physically abused: Not on file     Forced sexual activity: Not on file   Other Topics Concern    Not on file   Social History Narrative    Not on file         ALLERGIES: Gluten    Review of Systems   Constitutional: Negative for chills and fever. HENT: Negative for facial swelling and sore throat. Eyes: Negative for photophobia and visual disturbance. Respiratory: Negative for cough and shortness of breath. Cardiovascular: Negative for chest pain and palpitations. Gastrointestinal: Negative for abdominal pain, diarrhea, nausea and vomiting. Endocrine: Negative for polydipsia and polyuria. Genitourinary: Negative for difficulty urinating and dysuria. Musculoskeletal: Positive for myalgias. Negative for back pain and neck pain. Skin: Positive for color change and wound. Negative for rash. Neurological: Negative for dizziness and syncope. Psychiatric/Behavioral: Negative for confusion and decreased concentration. Vitals:    03/03/21 1717   BP: 138/89   Pulse: 77   Resp: 16   Temp: 98.2 °F (36.8 °C)   SpO2: 100%   Weight: 61.2 kg (135 lb)   Height: 5' 8\" (1.727 m)            Physical Exam  Vitals signs and nursing note reviewed. Constitutional:       General: She is not in acute distress. Appearance: She is well-developed. HENT:      Head: Normocephalic and atraumatic. Right Ear: External ear normal.      Left Ear: External ear normal.      Nose: Nose normal.   Eyes:      Conjunctiva/sclera: Conjunctivae normal.      Pupils: Pupils are equal, round, and reactive to light. Neck:      Musculoskeletal: Normal range of motion and neck supple. Cardiovascular:      Rate and Rhythm: Normal rate. Pulmonary:      Effort: Pulmonary effort is normal. No respiratory distress. Musculoskeletal: Normal range of motion. General: Tenderness and signs of injury present. Arms:    Skin:     General: Skin is warm and dry. Findings: Bruising present. No rash. Neurological:      Mental Status: She is alert and oriented to person, place, and time. Cranial Nerves: No cranial nerve deficit. Coordination: Coordination normal.   Psychiatric:         Behavior: Behavior normal.         Thought Content: Thought content normal.         Judgment: Judgment normal.          MDM  Number of Diagnoses or Management Options  Dog bite of right wrist, initial encounter  Diagnosis management comments: 71-year-old female presents with significant other for evaluation after dog bite. She was running and someone else's dog came up to her and bit her. It did not hurt initially but shortly after she noticed that she was bleeding. She does not know the person or the dog. Unknown if the dog is current on rabies vaccine. She states that however, the dog did not appear ill. Unknown last tetanus. Bleeding controlled. Wounds minimal.  Waiting xray now. Will provide rabies protocol. 6:47 PM  hyperrab 4.1 ml injected to areas surrounding wounds. Lot number [id number], exp 15 dec 2021.        Amount and/or Complexity of Data Reviewed  Tests in the radiology section of CPT®: ordered and reviewed    Risk of Complications, Morbidity, and/or Mortality  Presenting problems: minimal  Diagnostic procedures: minimal  Management options: low           Procedures

## 2021-03-03 NOTE — Clinical Note
Cl[facility] EMERGENCY DEPT 
ONE  [address] [facility] 
[phone] Work/School Note Date: 3/3/2021 To Whom It May concern: 
 
 
Dawna Gaitan was seen and treated today in the emergency room by the following provider(s): 
Attending Provider: Gamaliel Carmen MD 
Nurse Practitioner: Na Null NP. Dawna Gaitan is excused from work/school on 03/03/21. She is clear to return to work/school on 03/04/21. Sincerely, Edgardo Kowalski NP

## 2021-03-03 NOTE — ED TRIAGE NOTES
Pt arrives ambulatory to triage. Reports at about 4pm today she was running on a trail and another person's dog bit her. Reports she does not know the dog or person, so unknown if it has had it shots. Small amount of blood to right forearm, bruise to right hand. Able to move hand and fingers, with pain.

## 2021-03-04 NOTE — ED NOTES
I have reviewed discharge instructions with the patient. The patient verbalized understanding. Patient left ED via Discharge Method: ambulatory to Home with family. Opportunity for questions and clarification provided. Patient given 2 scripts. To continue your aftercare when you leave the hospital, you may receive an automated call from our care team to check in on how you are doing. This is a free service and part of our promise to provide the best care and service to meet your aftercare needs.  If you have questions, or wish to unsubscribe from this service please call [phone]. Thank you for Choosing our [facility] Emergency Department.

## 2021-03-06 ENCOUNTER — HOSPITAL ENCOUNTER (OUTPATIENT)
Dept: INFUSION THERAPY | Age: 31
Discharge: HOME | End: 2021-03-06
Payer: OTHER GOVERNMENT

## 2021-03-06 VITALS
HEIGHT: 68 IN | DIASTOLIC BLOOD PRESSURE: 72 MMHG | TEMPERATURE: 97.3 F | BODY MASS INDEX: 21.1 KG/M2 | HEART RATE: 69 BPM | WEIGHT: 139.2 LBS | SYSTOLIC BLOOD PRESSURE: 120 MMHG | RESPIRATION RATE: 16 BRPM | OXYGEN SATURATION: 100 %

## 2021-03-06 PROCEDURE — 90675 RABIES VACCINE IM: CPT

## 2021-03-06 PROCEDURE — 74011250636 HC RX REV CODE- 250/636

## 2021-03-06 PROCEDURE — 90471 IMMUNIZATION ADMIN: CPT

## 2021-03-06 RX ADMIN — Medication 2.5 UNITS: at 13:35

## 2021-03-06 NOTE — PROGRESS NOTES
Arrived to the [facility]. Rabavert IM injection completed in left deltoid muscle. Patient tolerated well. Any issues or concerns during appointment: No  Side effects of medication were discussed with the pt. Discharged home in stable condition.

## 2021-03-10 ENCOUNTER — HOSPITAL ENCOUNTER (OUTPATIENT)
Dept: INFUSION THERAPY | Age: 31
Discharge: HOME | End: 2021-03-10
Payer: OTHER GOVERNMENT

## 2021-03-10 VITALS
SYSTOLIC BLOOD PRESSURE: 107 MMHG | TEMPERATURE: 98.9 F | HEART RATE: 78 BPM | OXYGEN SATURATION: 100 % | RESPIRATION RATE: 16 BRPM | DIASTOLIC BLOOD PRESSURE: 64 MMHG

## 2021-03-10 PROCEDURE — 74011250636 HC RX REV CODE- 250/636: Performed by: NURSE PRACTITIONER

## 2021-03-10 PROCEDURE — 90675 RABIES VACCINE IM: CPT | Performed by: NURSE PRACTITIONER

## 2021-03-10 PROCEDURE — 90471 IMMUNIZATION ADMIN: CPT

## 2021-03-10 RX ADMIN — Medication 2.5 UNITS: at 16:01

## 2021-03-10 NOTE — PROGRESS NOTES
Arrived to the [facility]. Assessment complete. Rabavert completed. Patient tolerated without problems. Any issues or concerns during appointment: None. Patient aware of next infusion appointment on 3/17/2021 (date) at 1600 (time). Discharged ambulatory.

## 2021-03-17 ENCOUNTER — APPOINTMENT (RX ONLY)
Dept: URBAN - METROPOLITAN AREA CLINIC 349 | Facility: CLINIC | Age: 31
Setting detail: DERMATOLOGY
End: 2021-03-17

## 2021-03-17 ENCOUNTER — HOSPITAL ENCOUNTER (OUTPATIENT)
Dept: INFUSION THERAPY | Age: 31
Discharge: HOME | End: 2021-03-17
Payer: OTHER GOVERNMENT

## 2021-03-17 VITALS
TEMPERATURE: 99 F | HEART RATE: 65 BPM | OXYGEN SATURATION: 100 % | DIASTOLIC BLOOD PRESSURE: 72 MMHG | SYSTOLIC BLOOD PRESSURE: 119 MMHG | RESPIRATION RATE: 16 BRPM

## 2021-03-17 DIAGNOSIS — L30.9 DERMATITIS, UNSPECIFIED: ICD-10-CM | Status: IMPROVED

## 2021-03-17 DIAGNOSIS — L81.0 POSTINFLAMMATORY HYPERPIGMENTATION: ICD-10-CM

## 2021-03-17 PROBLEM — D23.39 OTHER BENIGN NEOPLASM OF SKIN OF OTHER PARTS OF FACE: Status: ACTIVE | Noted: 2021-03-17

## 2021-03-17 PROCEDURE — ? OTHER

## 2021-03-17 PROCEDURE — ? COUNSELING

## 2021-03-17 PROCEDURE — 99212 OFFICE O/P EST SF 10 MIN: CPT

## 2021-03-17 PROCEDURE — 90675 RABIES VACCINE IM: CPT | Performed by: NURSE PRACTITIONER

## 2021-03-17 PROCEDURE — 90471 IMMUNIZATION ADMIN: CPT

## 2021-03-17 PROCEDURE — 74011250636 HC RX REV CODE- 250/636: Performed by: NURSE PRACTITIONER

## 2021-03-17 PROCEDURE — ? DEFER

## 2021-03-17 RX ADMIN — Medication 2.5 UNITS: at 16:06

## 2021-03-17 ASSESSMENT — LOCATION ZONE DERM: LOCATION ZONE: TRUNK

## 2021-03-17 ASSESSMENT — LOCATION DETAILED DESCRIPTION DERM
LOCATION DETAILED: LEFT RIB CAGE
LOCATION DETAILED: SUPERIOR LUMBAR SPINE
LOCATION DETAILED: RIGHT LATERAL ABDOMEN

## 2021-03-17 ASSESSMENT — LOCATION SIMPLE DESCRIPTION DERM
LOCATION SIMPLE: LOWER BACK
LOCATION SIMPLE: ABDOMEN

## 2021-03-17 NOTE — PROCEDURE: OTHER
Detail Level: Zone
Note Text (......Xxx Chief Complaint.): This diagnosis correlates with the
Render Risk Assessment In Note?: yes
Other (Free Text): Advised patient to consider treatment in the fall
Other (Free Text): Assured patient it Will fade over time
Other (Free Text): Patient stated she’s doing alot better. The rash is almost all gone. She currently is not using any topical medications. \\n\\nAdvised patient that if she flares again, to come in and we will biopsy rash.

## 2021-03-17 NOTE — PROCEDURE: DEFER
Introduction Text (Please End With A Colon): Procedure to be performed: Extraction
Detail Level: Simple

## 2021-03-17 NOTE — PROGRESS NOTES
Arrived to the [facility]. Rabavert vaccine administered IM in left deltoid. Patient tolerated well. Any issues or concerns during appointment: No.  Discharged home in stable condition.

## 2021-03-17 NOTE — HPI: RASH
Is The Patient Presenting As Previously Scheduled?: Yes
How Severe Is Your Rash?: mild
Is This A New Presentation, Or A Follow-Up?: Follow Up Rash
Additional History: Patient is here for a follow up on rash. She stated it’s a lot better

## 2022-01-06 ENCOUNTER — APPOINTMENT (RX ONLY)
Dept: URBAN - METROPOLITAN AREA CLINIC 349 | Facility: CLINIC | Age: 32
Setting detail: DERMATOLOGY
End: 2022-01-06

## 2022-01-06 DIAGNOSIS — Z80.8 FAMILY HISTORY OF MALIGNANT NEOPLASM OF OTHER ORGANS OR SYSTEMS: ICD-10-CM

## 2022-01-06 DIAGNOSIS — D22 MELANOCYTIC NEVI: ICD-10-CM

## 2022-01-06 PROBLEM — D22.72 MELANOCYTIC NEVI OF LEFT LOWER LIMB, INCLUDING HIP: Status: ACTIVE | Noted: 2022-01-06

## 2022-01-06 PROBLEM — D23.39 OTHER BENIGN NEOPLASM OF SKIN OF OTHER PARTS OF FACE: Status: ACTIVE | Noted: 2022-01-06

## 2022-01-06 PROCEDURE — 99212 OFFICE O/P EST SF 10 MIN: CPT | Mod: 25

## 2022-01-06 PROCEDURE — ? SHAVE REMOVAL

## 2022-01-06 PROCEDURE — 11311 SHAVE SKIN LESION 0.6-1.0 CM: CPT

## 2022-01-06 PROCEDURE — ? COUNSELING

## 2022-01-06 ASSESSMENT — LOCATION ZONE DERM: LOCATION ZONE: LEG

## 2022-01-06 ASSESSMENT — LOCATION SIMPLE DESCRIPTION DERM: LOCATION SIMPLE: LEFT ANKLE

## 2022-01-06 ASSESSMENT — LOCATION DETAILED DESCRIPTION DERM: LOCATION DETAILED: LEFT POSTERIOR ANKLE

## 2022-01-06 NOTE — PROCEDURE: SHAVE REMOVAL
Medical Necessity Information: It is in your best interest to select a reason for this procedure from the list below. All of these items fulfill various CMS LCD requirements except the new and changing color options.
Anesthesia Type: 1% lidocaine with epinephrine
Post-Care Instructions: I reviewed with the patient in detail post-care instructions. Patient is to keep the biopsy site dry overnight, and then apply bacitracin twice daily until healed. Patient may apply hydrogen peroxide soaks to remove any crusting.
Medical Necessity Clause: This procedure was medically necessary because the lesion that was treated was:
Billing Type: Third-Party Bill
Was A Bandage Applied: Yes
Anesthesia Volume In Cc: 0.4
Render Path Notes In Note?: No
X Size Of Lesion In Cm (Optional): 0
Detail Level: Detailed
Consent was obtained from the patient. The risks and benefits to therapy were discussed in detail. Specifically, the risks of infection, scarring, bleeding, prolonged wound healing, incomplete removal, allergy to anesthesia, nerve injury and recurrence were addressed. Prior to the procedure, the treatment site was clearly identified and confirmed by the patient. All components of Universal Protocol/PAUSE Rule completed.
Notification Instructions: Patient will be notified of pathology results. However, patient instructed to call the office if not contacted within 2 weeks.
Biopsy Method: Dermablade
Wound Care: Petrolatum
Hemostasis: Drysol
Size Of Lesion In Cm (Required): 0.6

## 2022-01-06 NOTE — HPI: SKIN LESIONS
How Severe Is Your Skin Lesion?: mild
Have Your Skin Lesions Been Treated?: not been treated
Is This A New Presentation, Or A Follow-Up?: Skin Lesion
Which Family Member (Optional)?: Grandmother
Additional History: Patient stated she has a mole on her left leg that she would like to have looked at. Patient stated it has been present her whole life and denies any changes that she is aware of.

## 2022-06-21 ENCOUNTER — TELEPHONE (OUTPATIENT)
Dept: FAMILY MEDICINE CLINIC | Facility: CLINIC | Age: 32
End: 2022-06-21

## 2022-06-21 NOTE — TELEPHONE ENCOUNTER
----- Message from Lui No sent at 6/20/2022  2:32 PM EDT -----  Subject: Message to Provider    QUESTIONS  Information for Provider? PT Requesting in-person appointment for ear   pain, provider instructed her to make appointment if ear pain gets worse   and it has  ---------------------------------------------------------------------------  --------------  CALL BACK INFO  What is the best way for the office to contact you? OK to leave message on   voicemail  Preferred Call Back Phone Number? [phone]  ---------------------------------------------------------------------------  --------------  SCRIPT ANSWERS  Relationship to Patient?  Self

## 2022-06-23 ENCOUNTER — TELEMEDICINE (OUTPATIENT)
Dept: FAMILY MEDICINE CLINIC | Facility: CLINIC | Age: 32
End: 2022-06-23
Payer: OTHER GOVERNMENT

## 2022-06-23 DIAGNOSIS — H66.002 NON-RECURRENT ACUTE SUPPURATIVE OTITIS MEDIA OF LEFT EAR WITHOUT SPONTANEOUS RUPTURE OF TYMPANIC MEMBRANE: Primary | ICD-10-CM

## 2022-06-23 PROCEDURE — 99213 OFFICE O/P EST LOW 20 MIN: CPT | Performed by: FAMILY MEDICINE

## 2022-06-23 RX ORDER — AMOXICILLIN AND CLAVULANATE POTASSIUM 875; 125 MG/1; MG/1
1 TABLET, FILM COATED ORAL 2 TIMES DAILY
Qty: 14 TABLET | Refills: 0 | Status: SHIPPED | OUTPATIENT
Start: 2022-06-23 | End: 2022-06-30

## 2022-06-23 SDOH — ECONOMIC STABILITY: FOOD INSECURITY: WITHIN THE PAST 12 MONTHS, YOU WORRIED THAT YOUR FOOD WOULD RUN OUT BEFORE YOU GOT MONEY TO BUY MORE.: NEVER TRUE

## 2022-06-23 SDOH — ECONOMIC STABILITY: FOOD INSECURITY: WITHIN THE PAST 12 MONTHS, THE FOOD YOU BOUGHT JUST DIDN'T LAST AND YOU DIDN'T HAVE MONEY TO GET MORE.: NEVER TRUE

## 2022-06-23 ASSESSMENT — ENCOUNTER SYMPTOMS
DIARRHEA: 0
WHEEZING: 0
ABDOMINAL PAIN: 0
NAUSEA: 0
SHORTNESS OF BREATH: 0
RHINORRHEA: 0
SORE THROAT: 0
COUGH: 0
VOMITING: 0
SINUS PRESSURE: 0

## 2022-06-23 ASSESSMENT — PATIENT HEALTH QUESTIONNAIRE - PHQ9
5. POOR APPETITE OR OVEREATING: 0
2. FEELING DOWN, DEPRESSED OR HOPELESS: 0
7. TROUBLE CONCENTRATING ON THINGS, SUCH AS READING THE NEWSPAPER OR WATCHING TELEVISION: 0
6. FEELING BAD ABOUT YOURSELF - OR THAT YOU ARE A FAILURE OR HAVE LET YOURSELF OR YOUR FAMILY DOWN: 0
SUM OF ALL RESPONSES TO PHQ QUESTIONS 1-9: 0
8. MOVING OR SPEAKING SO SLOWLY THAT OTHER PEOPLE COULD HAVE NOTICED. OR THE OPPOSITE, BEING SO FIGETY OR RESTLESS THAT YOU HAVE BEEN MOVING AROUND A LOT MORE THAN USUAL: 0
1. LITTLE INTEREST OR PLEASURE IN DOING THINGS: 0
SUM OF ALL RESPONSES TO PHQ QUESTIONS 1-9: 0
SUM OF ALL RESPONSES TO PHQ QUESTIONS 1-9: 0
3. TROUBLE FALLING OR STAYING ASLEEP: 0
9. THOUGHTS THAT YOU WOULD BE BETTER OFF DEAD, OR OF HURTING YOURSELF: 0
10. IF YOU CHECKED OFF ANY PROBLEMS, HOW DIFFICULT HAVE THESE PROBLEMS MADE IT FOR YOU TO DO YOUR WORK, TAKE CARE OF THINGS AT HOME, OR GET ALONG WITH OTHER PEOPLE: 0
SUM OF ALL RESPONSES TO PHQ9 QUESTIONS 1 & 2: 0
4. FEELING TIRED OR HAVING LITTLE ENERGY: 0
SUM OF ALL RESPONSES TO PHQ QUESTIONS 1-9: 0

## 2022-06-23 ASSESSMENT — SOCIAL DETERMINANTS OF HEALTH (SDOH): HOW HARD IS IT FOR YOU TO PAY FOR THE VERY BASICS LIKE FOOD, HOUSING, MEDICAL CARE, AND HEATING?: NOT HARD AT ALL

## 2022-06-23 NOTE — PROGRESS NOTES
Maryam Pat (:  1990) is a Established patient, here for evaluation of the following:    Assessment & Plan   Below is the assessment and plan developed based on review of pertinent history, physical exam, labs, studies, and medications. 1. Non-recurrent acute suppurative otitis media of left ear without spontaneous rupture of tympanic membrane  -     amoxicillin-clavulanate (AUGMENTIN) 875-125 MG per tablet; Take 1 tablet by mouth 2 times daily for 7 days, Disp-14 tablet, R-0Normal   abx given in case pain worsens. iov to examine ear. Will likely need ENT referral.     No follow-ups on file. Subjective   HPI  Chief Complaint   Patient presents with    Follow-up    Ear Fullness    continues to have stuffy ears in the morning despite nasacort and daily antihistamine. Gets to the point where she cannot hear intermittently throughout the day. Feels like she is on a plane and her ears are popped. She can sometimes put her finger in her ear and agitate it and it gets better. has no past medical history on file. Review of Systems   Constitutional: Negative for chills, diaphoresis, fatigue and fever. HENT: Positive for congestion (of ears--feels full), ear pain and hearing loss (since 16y/o no yearing in right ear. ). Negative for ear discharge, postnasal drip, rhinorrhea, sinus pressure, sneezing and sore throat. Respiratory: Negative for cough, shortness of breath and wheezing. Cardiovascular: Negative for chest pain and palpitations. Gastrointestinal: Negative for abdominal pain, diarrhea, nausea and vomiting. Musculoskeletal: Negative for myalgias. Skin: Negative for rash. Neurological: Negative for headaches. Hematological: Negative for adenopathy.           Objective     Physical Exam  [INSTRUCTIONS:  \"[x]\" Indicates a positive item  \"[]\" Indicates a negative item  -- DELETE ALL ITEMS NOT EXAMINED]    Constitutional: [x] Appears well-developed and well-nourished [x] No apparent distress      [] Abnormal -     Mental status: [x] Alert and awake  [x] Oriented to person/place/time [x] Able to follow commands    [] Abnormal -     Eyes:   EOM    [x]  Normal    [] Abnormal -   Sclera  [x]  Normal    [] Abnormal -          Discharge [x]  None visible   [] Abnormal -     HENT: [x] Normocephalic, atraumatic  [] Abnormal -   [x] Mouth/Throat: Mucous membranes are moist    External Ears [x] Normal  [] Abnormal -    Neck: [x] No visualized mass [] Abnormal -     Pulmonary/Chest: [x] Respiratory effort normal   [x] No visualized signs of difficulty breathing or respiratory distress        [] Abnormal -      Musculoskeletal:   [x] Normal gait with no signs of ataxia         [x] Normal range of motion of neck        [] Abnormal -     Neurological:        [x] No Facial Asymmetry (Cranial nerve 7 motor function) (limited exam due to video visit)          [x] No gaze palsy        [] Abnormal -          Skin:        [x] No significant exanthematous lesions or discoloration noted on facial skin         [] Abnormal -            Psychiatric:       [x] Normal Affect [] Abnormal -        [x] No Hallucinations    Other pertinent observable physical exam findings:-           No flowsheet data found. Patient-Reported Vitals  No data recorded         Andrew Moreno, was evaluated through a synchronous (real-time) audio-video encounter. The patient (or guardian if applicable) is aware that this is a billable service, which includes applicable co-pays. This Virtual Visit was conducted with patient's (and/or legal guardian's) consent. The visit was conducted pursuant to the emergency declaration under the [address] [place] [address] authority and the [employer] General Act. Patient identification was verified, and a caregiver was present when appropriate.    The patient was located at Home: [address] [place] [id number].    Provider was located at Home ([facility]): Raciel Cesar MD

## 2022-09-28 RX ORDER — SERTRALINE HYDROCHLORIDE 25 MG/1
25 TABLET, FILM COATED ORAL DAILY
Qty: 90 TABLET | Refills: 1 | Status: SHIPPED | OUTPATIENT
Start: 2022-09-28

## 2022-09-28 NOTE — TELEPHONE ENCOUNTER
Patient called to see about getting refill on her Zoloft. She stated that she is about to move to [facility], and want to get as many pills as she can, because she is not sure how she will get this medication once she is over there.